# Patient Record
Sex: FEMALE | Race: WHITE | NOT HISPANIC OR LATINO | URBAN - METROPOLITAN AREA
[De-identification: names, ages, dates, MRNs, and addresses within clinical notes are randomized per-mention and may not be internally consistent; named-entity substitution may affect disease eponyms.]

---

## 2017-01-03 ENCOUNTER — IMPORTED ENCOUNTER (OUTPATIENT)
Dept: URBAN - METROPOLITAN AREA CLINIC 50 | Facility: CLINIC | Age: 77
End: 2017-01-03

## 2017-01-03 NOTE — PATIENT DISCUSSION
"""Informed patient that their capsular opacification is not visually significant or does not meet the minimum criteria for capsulotomy.  Recommended attention to PCO symptoms

## 2017-10-17 ENCOUNTER — IMPORTED ENCOUNTER (OUTPATIENT)
Dept: URBAN - METROPOLITAN AREA CLINIC 50 | Facility: CLINIC | Age: 77
End: 2017-10-17

## 2017-11-21 ENCOUNTER — IMPORTED ENCOUNTER (OUTPATIENT)
Dept: URBAN - METROPOLITAN AREA CLINIC 50 | Facility: CLINIC | Age: 77
End: 2017-11-21

## 2018-03-13 ENCOUNTER — IMPORTED ENCOUNTER (OUTPATIENT)
Dept: URBAN - METROPOLITAN AREA CLINIC 50 | Facility: CLINIC | Age: 78
End: 2018-03-13

## 2018-08-20 ENCOUNTER — IMPORTED ENCOUNTER (OUTPATIENT)
Dept: URBAN - METROPOLITAN AREA CLINIC 50 | Facility: CLINIC | Age: 78
End: 2018-08-20

## 2018-10-30 ENCOUNTER — IMPORTED ENCOUNTER (OUTPATIENT)
Dept: URBAN - METROPOLITAN AREA CLINIC 50 | Facility: CLINIC | Age: 78
End: 2018-10-30

## 2018-11-20 ENCOUNTER — IMPORTED ENCOUNTER (OUTPATIENT)
Dept: URBAN - METROPOLITAN AREA CLINIC 50 | Facility: CLINIC | Age: 78
End: 2018-11-20

## 2019-02-26 ENCOUNTER — IMPORTED ENCOUNTER (OUTPATIENT)
Dept: URBAN - METROPOLITAN AREA CLINIC 50 | Facility: CLINIC | Age: 79
End: 2019-02-26

## 2019-02-26 NOTE — PATIENT DISCUSSION
"""Reviewed testing with patient"" ""Reassured patient that intraocular pressures (IOPs) are at target levels and other ocular findings are stable. Continue present management and/or medication(s).  Follow up as directed. "" ""Continue Latanoprost both eyes at bedtime ."""

## 2019-09-16 ENCOUNTER — IMPORTED ENCOUNTER (OUTPATIENT)
Dept: URBAN - METROPOLITAN AREA CLINIC 50 | Facility: CLINIC | Age: 79
End: 2019-09-16

## 2021-02-26 ENCOUNTER — IMMUNIZATION (OUTPATIENT)
Dept: VACCINE CLINIC | Facility: HOSPITAL | Age: 81
End: 2021-02-26

## 2021-02-26 PROCEDURE — 91300 HC SARSCOV02 VAC 30MCG/0.3ML IM: CPT | Performed by: INTERNAL MEDICINE

## 2021-02-26 PROCEDURE — 0001A: CPT | Performed by: INTERNAL MEDICINE

## 2021-03-19 ENCOUNTER — IMMUNIZATION (OUTPATIENT)
Dept: VACCINE CLINIC | Facility: HOSPITAL | Age: 81
End: 2021-03-19

## 2021-03-19 PROCEDURE — 91300 HC SARSCOV02 VAC 30MCG/0.3ML IM: CPT | Performed by: INTERNAL MEDICINE

## 2021-03-19 PROCEDURE — 0002A: CPT | Performed by: INTERNAL MEDICINE

## 2021-04-18 ASSESSMENT — VISUAL ACUITY
OD_SC: 20/30+1
OS_CC: J1@ 14 IN
OD_SC: 20/40+2
OS_SC: 20/20
OS_CC: 20/20
OD_CC: 20/30
OD_OTHER: 20/70. 20/100.
OD_CC: J1@ 14 IN
OS_SC: 20/20-2
OD_BAT: 20/70
OS_SC: 20/20
OD_CC: J1+
OS_CC: J1+
OS_OTHER: 20/30-2. 20/80.
OS_BAT: 20/30-2
OS_SC: 20/25
OD_CC: J1+
OD_SC: 20/30
OS_SC: 20/25-2
OS_CC: J1+
OD_SC: 20/30+2
OD_SC: 20/30+2

## 2021-04-18 ASSESSMENT — TONOMETRY
OD_IOP_MMHG: 16
OS_IOP_MMHG: 16
OS_IOP_MMHG: 17
OD_IOP_MMHG: 15
OD_IOP_MMHG: 18
OS_IOP_MMHG: 16
OS_IOP_MMHG: 18
OD_IOP_MMHG: 16
OS_IOP_MMHG: 18
OD_IOP_MMHG: 16